# Patient Record
Sex: FEMALE | Race: WHITE
[De-identification: names, ages, dates, MRNs, and addresses within clinical notes are randomized per-mention and may not be internally consistent; named-entity substitution may affect disease eponyms.]

---

## 2020-04-18 ENCOUNTER — HOSPITAL ENCOUNTER (EMERGENCY)
Dept: HOSPITAL 63 - ER | Age: 65
Discharge: HOME | End: 2020-04-18
Payer: OTHER GOVERNMENT

## 2020-04-18 VITALS — WEIGHT: 188.05 LBS | HEIGHT: 71 IN | BODY MASS INDEX: 26.33 KG/M2

## 2020-04-18 VITALS — DIASTOLIC BLOOD PRESSURE: 53 MMHG | SYSTOLIC BLOOD PRESSURE: 104 MMHG

## 2020-04-18 DIAGNOSIS — R05: ICD-10-CM

## 2020-04-18 DIAGNOSIS — E03.9: ICD-10-CM

## 2020-04-18 DIAGNOSIS — K21.9: ICD-10-CM

## 2020-04-18 DIAGNOSIS — Z88.8: ICD-10-CM

## 2020-04-18 DIAGNOSIS — R50.9: ICD-10-CM

## 2020-04-18 DIAGNOSIS — I48.20: ICD-10-CM

## 2020-04-18 DIAGNOSIS — I10: ICD-10-CM

## 2020-04-18 DIAGNOSIS — Z20.818: ICD-10-CM

## 2020-04-18 DIAGNOSIS — B34.9: Primary | ICD-10-CM

## 2020-04-18 DIAGNOSIS — R07.89: ICD-10-CM

## 2020-04-18 DIAGNOSIS — J45.909: ICD-10-CM

## 2020-04-18 LAB
ALBUMIN SERPL-MCNC: 3.9 G/DL (ref 3.4–5)
ALBUMIN/GLOB SERPL: 1.1 {RATIO} (ref 1–1.7)
ALP SERPL-CCNC: 64 U/L (ref 46–116)
ALT SERPL-CCNC: 50 U/L (ref 14–59)
ANION GAP SERPL CALC-SCNC: 8 MMOL/L (ref 6–14)
AST SERPL-CCNC: 29 U/L (ref 15–37)
BASOPHILS # BLD AUTO: 0 X10^3/UL (ref 0–0.2)
BASOPHILS NFR BLD: 0 % (ref 0–3)
BILIRUB SERPL-MCNC: 0.6 MG/DL (ref 0.2–1)
BUN/CREAT SERPL: 10 (ref 6–20)
CA-I SERPL ISE-MCNC: 8 MG/DL (ref 7–20)
CALCIUM SERPL-MCNC: 9 MG/DL (ref 8.5–10.1)
CHLORIDE SERPL-SCNC: 97 MMOL/L (ref 98–107)
CO2 SERPL-SCNC: 29 MMOL/L (ref 21–32)
CREAT SERPL-MCNC: 0.8 MG/DL (ref 0.6–1)
CRP SERPL-MCNC: 16.3 MG/L (ref 0–3.3)
EOSINOPHIL NFR BLD: 0 % (ref 0–3)
EOSINOPHIL NFR BLD: 0 X10^3/UL (ref 0–0.7)
ERYTHROCYTE [DISTWIDTH] IN BLOOD BY AUTOMATED COUNT: 12.8 % (ref 11.5–14.5)
GFR SERPLBLD BASED ON 1.73 SQ M-ARVRAT: 72.4 ML/MIN
GLOBULIN SER-MCNC: 3.7 G/DL (ref 2.2–3.8)
GLUCOSE SERPL-MCNC: 111 MG/DL (ref 70–99)
HCT VFR BLD CALC: 46.6 % (ref 36–47)
HGB BLD-MCNC: 16.1 G/DL (ref 12–15.5)
LYMPHOCYTES # BLD: 0.5 X10^3/UL (ref 1–4.8)
LYMPHOCYTES NFR BLD AUTO: 19 % (ref 24–48)
MCH RBC QN AUTO: 33 PG (ref 25–35)
MCHC RBC AUTO-ENTMCNC: 35 G/DL (ref 31–37)
MCV RBC AUTO: 96 FL (ref 79–100)
MONO #: 0.2 X10^3/UL (ref 0–1.1)
MONOCYTES NFR BLD: 9 % (ref 0–9)
NEUT #: 2 X10^3UL (ref 1.8–7.7)
NEUTROPHILS NFR BLD AUTO: 73 % (ref 31–73)
PLATELET # BLD AUTO: 170 X10^3/UL (ref 140–400)
POTASSIUM SERPL-SCNC: 3.4 MMOL/L (ref 3.5–5.1)
PROT SERPL-MCNC: 7.6 G/DL (ref 6.4–8.2)
RBC # BLD AUTO: 4.86 X10^6/UL (ref 3.5–5.4)
SODIUM SERPL-SCNC: 134 MMOL/L (ref 136–145)
WBC # BLD AUTO: 2.7 X10^3/UL (ref 4–11)

## 2020-04-18 PROCEDURE — 84484 ASSAY OF TROPONIN QUANT: CPT

## 2020-04-18 PROCEDURE — 82728 ASSAY OF FERRITIN: CPT

## 2020-04-18 PROCEDURE — 83880 ASSAY OF NATRIURETIC PEPTIDE: CPT

## 2020-04-18 PROCEDURE — 99284 EMERGENCY DEPT VISIT MOD MDM: CPT

## 2020-04-18 PROCEDURE — 86140 C-REACTIVE PROTEIN: CPT

## 2020-04-18 PROCEDURE — 36415 COLL VENOUS BLD VENIPUNCTURE: CPT

## 2020-04-18 PROCEDURE — 87040 BLOOD CULTURE FOR BACTERIA: CPT

## 2020-04-18 PROCEDURE — 84145 PROCALCITONIN (PCT): CPT

## 2020-04-18 PROCEDURE — 80053 COMPREHEN METABOLIC PANEL: CPT

## 2020-04-18 PROCEDURE — 71045 X-RAY EXAM CHEST 1 VIEW: CPT

## 2020-04-18 PROCEDURE — 85025 COMPLETE CBC W/AUTO DIFF WBC: CPT

## 2020-04-18 NOTE — RAD
Single view chest dated 4/18/2020.

 

Comparison made to 4/15/2020.

 

CLINICAL INDICATION: Shortness of breath.

 

FINDINGS:

 

Single upright portable exam performed. Heart and mediastinal contours are

stable. Lungs are clear. No consolidation or pleural effusion. No 

pneumothorax.

 

IMPRESSION:

 

No acute radiographic abnormality. Stable findings compared to 4/15/2020.

 

Electronically signed by: Adalid Whittaker MD (4/18/2020 2:02 PM) 

[facility] [place]

## 2020-04-20 NOTE — PHYS DOC
Past History


Past Medical History:  A-Fib, Asthma, GERD, Hypertension, Hypothyroid


Past Surgical History:  No Surgical History


Alcohol Use:  None


Drug Use:  None





Adult General


Chief Complaint


Chief Complaint:  SHORTNESS OF BREATH





HPI


HPI





Patient is a female history of A. fib, COPD recent diagnosis of COVID-19 

presents with productive cough, sweats, chest tightness. Patient also reports 

body aches and fever. Temperature 101. Patient evaluated emergency department 3 

days ago for the same. Denies progression of symptoms. No nausea vomiting 

diarrhea. Denies decreased oral intake. []





Review of Systems


Review of Systems


ROS as per HPI


All other systems were reviewed and found to be within normal limits, except as 

documented in this note.





Current Medications


Current Medications





Current Medications








 Medications


  (Trade)  Dose


 Ordered  Sig/Tammi  Start Time


 Stop Time Status Last Admin


Dose Admin


 


 Acetaminophen


  (Tylenol)  650 mg  1X  ONCE  4/18/20 13:45


 4/18/20 13:46 DC 4/18/20 13:40


650 MG











Allergies


Allergies





Allergies








Coded Allergies Type Severity Reaction Last Updated Verified


 


  rivaroxaban Allergy Unknown  4/20/20 Yes











Physical Exam


Physical Exam





Constitutional: Well developed, well nourished, no acute distress, non-toxic 

appearance. []


HENT: Normocephalic, atraumatic, bilateral external ears normal, oropharynx 

moist, no oral exudates, nose normal. []


Eyes: PERRLA, EOMI, conjunctiva normal, no discharge. [] 


Neck: Normal range of motion, no tenderness, supple, no stridor. [] 


Cardiovascular:Heart rate regular rhythm, no murmur []


Lungs & Thorax:  Bilateral breath sounds clear to auscultation []


Abdomen: Bowel sounds normal, soft, no tenderness. [] 


Skin: Warm, dry, no erythema, no rash. [] 


Back: No tenderness,. [] 


Extremities: No tenderness, no edema. [] 


Neurologic: Alert and oriented X 3, normal motor function, normal sensory 

function, no focal deficits noted. []


Psychologic: Affect normal, judgement normal, mood normal. []





Current Patient Data


Vital Signs





                                   Vital Signs








  Date Time  Temp Pulse Resp B/P (MAP) Pulse Ox O2 Delivery O2 Flow Rate FiO2


 


4/18/20 14:24  73 20 104/53 (70) 94 Room Air  


 


4/18/20 13:20 101.0       








Lab Results





Microbiology


4/18/20 Blood Culture - Preliminary, Resulted


          NO GROWTH AFTER 1 DAY...





EKG


EKG


[]





Radiology/Procedures


Radiology/Procedures


[Chear x-ray: Reviewed]





Course & Med Decision Making


Course & Med Decision Making


Pertinent Labs and Imaging studies reviewed. (See chart for details)





[COVID without significant respiratory compromise. Lab work reviewed and reassu

ring. Symptom improvement and treatment. Recommend continued home 14, watchful 

waiting and supportive care and return precautions reviewed. Patient verbalizes 

understanding agreement discharge instructions prior to departure.]





Dragon Disclaimer


Dragon Disclaimer


This electronic medical record was generated, in whole or in part, using a voice

recognition dictation system.





Departure


Departure:


Impression:  


   Primary Impression:  


   Viral syndrome


Disposition:  01 HOME/RESIDENCE PRIOR TO ADM


Condition:  STABLE


Patient Instructions:  Viral Syndrome





Additional Instructions:  


You were evaluated in the ED for fever, bodyaches and cough. Labs and imaging 

were reviewed and are conistent with a viral syndrome and likely COVID-19. 

Please continue to stay hydrated and take Tylenol for fever.  Continue self-

quarentine and return to the ED if symptoms worsen.











ROSALEE INMAN DO                   Apr 20, 2020 11:58

## 2021-11-27 ENCOUNTER — HOSPITAL ENCOUNTER (EMERGENCY)
Dept: HOSPITAL 63 - ER | Age: 66
Discharge: HOME | End: 2021-11-27
Payer: OTHER GOVERNMENT

## 2021-11-27 VITALS — WEIGHT: 188.05 LBS | BODY MASS INDEX: 26.33 KG/M2 | HEIGHT: 71 IN

## 2021-11-27 VITALS
SYSTOLIC BLOOD PRESSURE: 136 MMHG | DIASTOLIC BLOOD PRESSURE: 81 MMHG | DIASTOLIC BLOOD PRESSURE: 81 MMHG | SYSTOLIC BLOOD PRESSURE: 136 MMHG

## 2021-11-27 DIAGNOSIS — J45.909: ICD-10-CM

## 2021-11-27 DIAGNOSIS — R42: Primary | ICD-10-CM

## 2021-11-27 DIAGNOSIS — I10: ICD-10-CM

## 2021-11-27 DIAGNOSIS — Z20.822: ICD-10-CM

## 2021-11-27 LAB
ALBUMIN SERPL-MCNC: 4 G/DL (ref 3.4–5)
ALBUMIN/GLOB SERPL: 1.6 {RATIO} (ref 1–1.7)
ALP SERPL-CCNC: 80 U/L (ref 46–116)
ALT SERPL-CCNC: 35 U/L (ref 14–59)
ANION GAP SERPL CALC-SCNC: 9 MMOL/L (ref 6–14)
APTT PPP: YELLOW S
AST SERPL-CCNC: 18 U/L (ref 15–37)
BACTERIA #/AREA URNS HPF: 0 /HPF
BASOPHILS # BLD AUTO: 0 X10^3/UL (ref 0–0.2)
BASOPHILS NFR BLD: 0 % (ref 0–3)
BILIRUB SERPL-MCNC: 0.6 MG/DL (ref 0.2–1)
BILIRUB UR QL STRIP: (no result)
BUN/CREAT SERPL: 21 (ref 6–20)
CA-I SERPL ISE-MCNC: 15 MG/DL (ref 7–20)
CALCIUM SERPL-MCNC: 8.9 MG/DL (ref 8.5–10.1)
CHLORIDE SERPL-SCNC: 105 MMOL/L (ref 98–107)
CO2 SERPL-SCNC: 30 MMOL/L (ref 21–32)
CREAT SERPL-MCNC: 0.7 MG/DL (ref 0.6–1)
EOSINOPHIL NFR BLD: 0 X10^3/UL (ref 0–0.7)
EOSINOPHIL NFR BLD: 1 % (ref 0–3)
ERYTHROCYTE [DISTWIDTH] IN BLOOD BY AUTOMATED COUNT: 12.8 % (ref 11.5–14.5)
FIBRINOGEN PPP-MCNC: CLEAR MG/DL
GFR SERPLBLD BASED ON 1.73 SQ M-ARVRAT: 84.2 ML/MIN
GLOBULIN SER-MCNC: 2.5 G/DL (ref 2.2–3.8)
GLUCOSE SERPL-MCNC: 100 MG/DL (ref 70–99)
GLUCOSE UR STRIP-MCNC: (no result) MG/DL
HCT VFR BLD CALC: 45.4 % (ref 36–47)
HGB BLD-MCNC: 15.1 G/DL (ref 12–15.5)
LYMPHOCYTES # BLD: 1 X10^3/UL (ref 1–4.8)
LYMPHOCYTES NFR BLD AUTO: 17 % (ref 24–48)
MAGNESIUM SERPL-MCNC: 2.4 MG/DL (ref 1.8–2.4)
MCH RBC QN AUTO: 33 PG (ref 25–35)
MCHC RBC AUTO-ENTMCNC: 33 G/DL (ref 31–37)
MCV RBC AUTO: 100 FL (ref 79–100)
MONO #: 0.4 X10^3/UL (ref 0–1.1)
MONOCYTES NFR BLD: 6 % (ref 0–9)
NEUT #: 4.5 X10^3UL (ref 1.8–7.7)
NEUTROPHILS NFR BLD AUTO: 76 % (ref 31–73)
NITRITE UR QL STRIP: (no result)
PHOSPHATE SERPL-MCNC: 3.4 MG/DL (ref 2.6–4.7)
PLATELET # BLD AUTO: 217 X10^3/UL (ref 140–400)
POTASSIUM SERPL-SCNC: 4.5 MMOL/L (ref 3.5–5.1)
PROT SERPL-MCNC: 6.5 G/DL (ref 6.4–8.2)
RBC # BLD AUTO: 4.56 X10^6/UL (ref 3.5–5.4)
RBC #/AREA URNS HPF: 0 /HPF (ref 0–2)
SODIUM SERPL-SCNC: 144 MMOL/L (ref 136–145)
SP GR UR STRIP: 1.01
UROBILINOGEN UR-MCNC: 0.2 MG/DL
WBC # BLD AUTO: 6 X10^3/UL (ref 4–11)
WBC #/AREA URNS HPF: 0 /HPF (ref 0–4)

## 2021-11-27 PROCEDURE — U0003 INFECTIOUS AGENT DETECTION BY NUCLEIC ACID (DNA OR RNA); SEVERE ACUTE RESPIRATORY SYNDROME CORONAVIRUS 2 (SARS-COV-2) (CORONAVIRUS DISEASE [COVID-19]), AMPLIFIED PROBE TECHNIQUE, MAKING USE OF HIGH THROUGHPUT TECHNOLOGIES AS DESCRIBED BY CMS-2020-01-R: HCPCS

## 2021-11-27 PROCEDURE — C9803 HOPD COVID-19 SPEC COLLECT: HCPCS

## 2021-11-27 PROCEDURE — 93005 ELECTROCARDIOGRAM TRACING: CPT

## 2021-11-27 PROCEDURE — 81001 URINALYSIS AUTO W/SCOPE: CPT

## 2021-11-27 PROCEDURE — 83735 ASSAY OF MAGNESIUM: CPT

## 2021-11-27 PROCEDURE — 83605 ASSAY OF LACTIC ACID: CPT

## 2021-11-27 PROCEDURE — 36415 COLL VENOUS BLD VENIPUNCTURE: CPT

## 2021-11-27 PROCEDURE — 70498 CT ANGIOGRAPHY NECK: CPT

## 2021-11-27 PROCEDURE — 84100 ASSAY OF PHOSPHORUS: CPT

## 2021-11-27 PROCEDURE — 70450 CT HEAD/BRAIN W/O DYE: CPT

## 2021-11-27 PROCEDURE — 85025 COMPLETE CBC W/AUTO DIFF WBC: CPT

## 2021-11-27 PROCEDURE — 83880 ASSAY OF NATRIURETIC PEPTIDE: CPT

## 2021-11-27 PROCEDURE — 84484 ASSAY OF TROPONIN QUANT: CPT

## 2021-11-27 PROCEDURE — 80053 COMPREHEN METABOLIC PANEL: CPT

## 2021-11-27 PROCEDURE — 70496 CT ANGIOGRAPHY HEAD: CPT

## 2021-11-27 RX ADMIN — IOHEXOL ONE ML: 350 INJECTION, SOLUTION INTRAVENOUS at 11:45

## 2021-11-27 RX ADMIN — MECLIZINE ONE MG: 12.5 TABLET ORAL at 13:00

## 2021-11-27 NOTE — RAD
PQRS Compliance Statement:



One or more of the following individualized dose reduction techniques were utilized for this examinat
ion:  

1. Automated exposure control  

2. Adjustment of the mA and/or kV according to patient size  

3. Use of iterative reconstruction technique



CTA HEAD AND NECK W/WO CONTRAST 11/27/2021 12:47 PM



INDICATION: Vertigo



COMPARISON: CT head 11/27/2021



TECHNIQUE: Multiple axial CT images of the head and neck were obtained after the intravenous administ
ration of nonionic contrast. Coronal and sagittal reformats are provided. Maximum intensity projectio
n images are provided.



Stenosis calculations for CT, MR, and conventional angiography are based upon measurements of the dis
lori ICA diameter in accordance with the NASCET methodology. Stenosis calculations for carotid ultraso
und studies are derived from validated velocity criteria which are known to correlate with the NASCET
 methodology.



FINDINGS:



Ventricles, sulci and basal cisterns are normal in appearance. Gray-white matter differentiation is p
reserved. There is no mass, mass effect or midline shift. Posterior fossa is normal in appearance. Se
lla and suprasellar cistern appear normal. Orbits are normal in appearance with exception of bilatera
l lens replacement. Scalp and calvaria appear intact. Paranasal sinuses are well aerated. Mastoid air
 cells are well aerated.



Visualized portions of lungs are clear. Thyroid gland is normal in appearance. Neck soft tissues are 
normal in appearance. No pathologically enlarged cervical lymphadenopathy. Pharynx and larynx appear 
intact.



Vascular findings:



There is ectasia of the ascending thoracic aorta measuring 4.3 cm. Origins of the brachiocephalic ves
sels are widely patent.



Right common carotid artery is normal in course and caliber. No significant atherosclerotic changes a
re identified at the right carotid bifurcation. Undulation of the contour of the cervical right inter
nal carotid artery favors hypervascular dysplasia. No significant stenosis of the right cervical inte
rnal carotid artery. External carotid artery is widely patent.



Left common carotid artery is normal in course and caliber. Minor calcified atheromatous plaque ident
ified at the left carotid bifurcation. No significant stenosis of the left cervical internal carotid 
artery. External carotid artery is widely patent.



Vertebral arteries are normal in course and caliber.



Intracranial segments of internal carotid arteries are normal in course and caliber. There is mild ca
lcified atheromatous plaque involving the cavernous segments without significant stenosis. Origins of
 the ophthalmic segments of the internal carotid artery appears widely patent. Middle cerebral arteri
es are normal in course and caliber with patent sylvian branches.  Anterior cerebral arteries are nor
mal in course and caliber. Anterior communicating artery is visualized.



Basilar artery is normal in course and caliber. Superior cerebellar arteries are widely patent. Poste
rior cerebral arteries are normal in course and caliber.



There is no aneurysm, vascular malformation or high-grade stenosis/large vessel occlusion involving c
ircle of [place]. Superior sagittal sinus is patent. Mild cervical spondylosis at C5-C6 and C6-C7.



IMPRESSION:

1. There is no evidence for acute intracranial hemorrhage.

2. There is no evidence for hemodynamically significant carotid stenosis. There is suggestion of fibr
omuscular dysplasia involving the distal right cervical internal carotid artery, less apparent involv
ing the left cervical internal carotid artery.

3. There is no aneurysm, vascular malformation or high-grade stenosis/large vessel occlusion involvin
g the [place] of [place]. 

4. Ectasia of the ascending thoracic aorta measures 4.3 cm.



Electronically signed by: Nohemy Gates MD (11/27/2021 1:30 PM) [id number]

## 2021-11-27 NOTE — RAD
EXAM: Head CT without contrast.



HISTORY: Vertigo.



TECHNIQUE: Computed tomographic images of the head were obtained without contrast.



*One or more of the following individualized dose reduction techniques were utilized for this examina
tion:  

1. Automated exposure control.  

2. Adjustment of the mA and/or kV according to patient size.  

3. Use of iterative reconstruction technique.



COMPARISON: None.



FINDINGS: There is no acute or subacute extra-axial or intraparenchymal hemorrhage. There is no mass 
effect or midline shift. There is no hydrocephalus. 



The gray-white matter differentiation pattern is intact.



The visualized portions of the orbits, paranasal sinuses and mastoid air cells are unremarkable. No s
uspicious calvarial lesion is seen.



IMPRESSION: 



No acute intracranial findings.



Electronically signed by: Melissa Michael MD (11/27/2021 1:03 PM) [id number]

## 2021-11-27 NOTE — PHYS DOC
Past History


Past Medical History:  A-Fib, Asthma, GERD, Hypertension, Hypothyroid


Additional Past Medical Histor:  tachycardia


Past Surgical History:  Other


Additional Past Surgical Histo:  right foot plantar


Alcohol Use:  Occasionally


Drug Use:  None





General Adult


EDM:


Chief Complaint:  DIZZY/LIGHT HEADED





HPI:


HPI:





Patient is a 65-year-old female coming in for episodes of "dizziness" that 

started yesterday.  Patient states about an hour and half she woke up she had 

gone to her  when she started to feel spinning and like she might pass o

ut.  Denies any vision changes, vomiting, or recent head injury.  She states she

laid down for a few hours and symptoms eventually resolved.  Started noticing 

them again today.  Patient states she woke up during the night to take 

over-the-counter medications for headache.  She says that she has pressure in 

her ears but denies any hearing loss or tinnitus.  Denies any congestion.  S

tates that she was exposed to a Covid positive family member 7 days ago, but has

had both of her Covid vaccines.  States she has a history of paroxysmal 

ventricular tachycardia and has been diagnosed with A. fib that happened 1 time.

 Denies any palpitations.  Did not take any blood thinners.  Takes Bystolic for 

her intermittent ventricular tachycardia.





Review of Systems:


Review of Systems:


All other systems within normal limits except for as noted in the HPI





Allergies:


Allergies:





Allergies








Coded Allergies Type Severity Reaction Last Updated Verified


 


  rivaroxaban Allergy Unknown  21 Yes











Physical Exam:


PE:


Constitutional: Well developed, well nourished, no acute distress, non-toxic 

appearance. []


HENT: Normocephalic, atraumatic, bilateral external ears normal,  nose normal.  

Bilateral ear canals and TMs normal []


Eyes: PERRLA, conjunctiva normal, no discharge.  Extremities intact, slight 

nystagmus to left [] 


Neck: No rigidity, supple, no stridor. [] 


Cardiovascular: Regular rate and rhythm, brisk cap refill []


Lungs & Thorax: Non labored symmetric respirations, no tachypnea or respiratory 

distress []


Abdomen: Soft, nondistended.


Skin: Warm, dry, no erythema, no rash. [] 


Back: Unremarkable


Extremities: No deformities, range of motion grossly intact, no lower extremity 

edema [] 


Neurologic: Alert and oriented X 3, no focal deficits noted.  CN II through XII 

intact and symmetric []


Psychologic: Affect normal, judgement normal, mood normal. []





Current Patient Data:


Vital Signs:





                                   Vital Signs








  Date Time  Temp Pulse Resp B/P (MAP) Pulse Ox O2 Delivery O2 Flow Rate FiO2


 


21 11:00 98.1 61 16 145/64 (91) 100 Room Air  











EKG:


EKG:


Sinus rhythm, heart rate 58 bpm, left axis deviation, no ST elevation or 

depression, no ectopy, (occasional T waves noted on monitor)





Radiology/Procedures:


Radiology/Procedures:


[facility] [address], [place] [address]


                                 [phone]


                                        


                                 IMAGING REPORT





                                     Signed





PATIENT: ROSA FARRIS ACCOUNT: [id number]     MRN#: [id number]


: 1955           LOCATION: ER              AGE: 65


SEX: F                    EXAM DT: 21         ACCESSION#: [id number]


STATUS: REG ER            ORD. PHYSICIAN: ZULY SOSA MD


REASON: vertigo


PROCEDURE: CT ANGIOGRAPHY HEAD AND NECK





PQRS Compliance Statement:





One or more of the following individualized dose reduction techniques were 

utilized for this examination:  


1. Automated exposure control  


2. Adjustment of the mA and/or kV according to patient size  


3. Use of iterative reconstruction technique





CTA HEAD AND NECK W/WO CONTRAST 2021 12:47 PM





INDICATION: Vertigo





COMPARISON: CT head 2021





TECHNIQUE: Multiple axial CT images of the head and neck were obtained after the

 intravenous administration of nonionic contrast. Coronal and sagittal reformats

 are provided. Maximum intensity projection images are provided.





Stenosis calculations for CT, MR, and conventional angiography are based upon 

measurements of the distal ICA diameter in accordance with the NASCET 

methodology. Stenosis calculations for carotid ultrasound studies are derived 

from validated velocity criteria which are known to correlate with the NASCET 

methodology.





FINDINGS:





Ventricles, sulci and basal cisterns are normal in appearance. Gray-white matter

 differentiation is preserved. There is no mass, mass effect or midline shift. 

Posterior fossa is normal in appearance. Sella and suprasellar cistern appear 

normal. Orbits are normal in appearance with exception of bilateral lens 

replacement. Scalp and calvaria appear intact. Paranasal sinuses are well 

aerated. Mastoid air cells are well aerated.





Visualized portions of lungs are clear. Thyroid gland is normal in appearance. 

Neck soft tissues are normal in appearance. No pathologically enlarged cervical 

lymphadenopathy. Pharynx and larynx appear intact.





Vascular findings:





There is ectasia of the ascending thoracic aorta measuring 4.3 cm. Origins of 

the brachiocephalic vessels are widely patent.





Right common carotid artery is normal in course and caliber. No significant 

atherosclerotic changes are identified at the right carotid bifurcation. 

Undulation of the contour of the cervical right internal carotid artery favors 

hypervascular dysplasia. No significant stenosis of the right cervical internal 

carotid artery. External carotid artery is widely patent.





Left common carotid artery is normal in course and caliber. Minor calcified 

atheromatous plaque identified at the left carotid bifurcation. No significant 

stenosis of the left cervical internal carotid artery. External carotid artery 

is widely patent.





Vertebral arteries are normal in course and caliber.





Intracranial segments of internal carotid arteries are normal in course and 

caliber. There is mild calcified atheromatous plaque involving the cavernous 

segments without significant stenosis. Origins of the ophthalmic segments of the

 internal carotid artery appears widely patent. Middle cerebral arteries are 

normal in course and caliber with patent sylvian branches.  Anterior cerebral 

arteries are normal in course and caliber. Anterior communicating artery is 

visualized.





Basilar artery is normal in course and caliber. Superior cerebellar arteries are

 widely patent. Posterior cerebral arteries are normal in course and caliber.





There is no aneurysm, vascular malformation or high-grade stenosis/large vessel 

occlusion involving [place] of [place]. Superior sagittal sinus is patent. Mild 

cervical spondylosis at C5-C6 and C6-C7.





IMPRESSION:


1. There is no evidence for acute intracranial hemorrhage.


2. There is no evidence for hemodynamically significant carotid stenosis. There 

is suggestion of fibromuscular dysplasia involving the distal right cervical 

internal carotid artery, less apparent involving the left cervical internal 

carotid artery.


3. There is no aneurysm, vascular malformation or high-grade stenosis/large 

vessel occlusion involving the [place] of [place]. 


4. Ectasia of the ascending thoracic aorta measures 4.3 cm.





Electronically signed by: Tomasz Marti MD (2021 1:30 PM) [id number]














DICTATED AND SIGNED BY:     TOMASZ MARTI MD


DATE:     21 1322





CC: ZULY SOSA MD; SERGIO PICKERING DO ~MTH0 0


[][facility] [address], [place] [address]


                                 [phone]


                                        


                                 IMAGING REPORT





                                     Signed





PATIENT: ROSA FARRIS ACCOUNT: [id number]     MRN#: [id number]


: 1955           LOCATION: ER              AGE: 65


SEX: F                    EXAM DT: 21         ACCESSION#: [id number]


STATUS: REG ER            ORD. PHYSICIAN: ZULY SOSA MD


REASON: vertigo


PROCEDURE: CT HEAD WO CONTRAST





EXAM: Head CT without contrast.





HISTORY: Vertigo.





TECHNIQUE: Computed tomographic images of the head were obtained without 

contrast.





*One or more of the following individualized dose reduction techniques were 

utilized for this examination:  


1. Automated exposure control.  


2. Adjustment of the mA and/or kV according to patient size.  


3. Use of iterative reconstruction technique.





COMPARISON: None.





FINDINGS: There is no acute or subacute extra-axial or intraparenchymal 

hemorrhage. There is no mass effect or midline shift. There is no hydrocephalus.

 





The gray-white matter differentiation pattern is intact.





The visualized portions of the orbits, paranasal sinuses and mastoid air cells 

are unremarkable. No suspicious calvarial lesion is seen.





IMPRESSION: 





No acute intracranial findings.





Electronically signed by: Melissa Maciel MD (2021 1:03 PM) [id number]














DICTATED AND SIGNED BY:     MELISSA MACIEL MD


DATE:     21 1302





CC: ZULY SOSA MD; SERGIO PICKERING DO ~MTH0 0





Heart Score:


C/O Chest Pain:  No


HEART Score for Chest Pain:  








HEART Score for Chest Pain Response (Comments) Value


 


History Slighlty/Non-Suspicious 0


 


ECG Normal 0


 


Age < 45 0


 


Risk Factors                            1 or 2 Risk Factors 1


 


Troponin < Normal Limit 0


 


Total  1








Risk Factors:


Risk Factors:  DM, Current or recent (<one month) smoker, HTN, HLP, family 

history of CAD, obesity.


Risk Scores:


Score 0 - 3:  2.5% MACE over next 6 weeks - Discharge Home


Score 4 - 6:  20.3% MACE over next 6 weeks - Admit for Clinical Observation


Score 7 - 10:  72.7% MACE over next 6 weeks - Early Invasive Strategies





Course & Med Decision Making:


Course & Med Decision Making


Pertinent Labs and Imaging studies reviewed. (See chart for details)





[]





Dragon Disclaimer:


Dragon Disclaimer:


This electronic medical record was generated, in whole or in part, using a voice

 recognition dictation system.





Departure


Departure:


Impression:  


   Primary Impression:  


   Vertigo


Disposition:   HOME / SELF CARE / HOMELESS


Condition:  STABLE


Referrals:  


SERGIO PICKERING DO (PCP)


Patient Instructions:  Vertigo


Scripts


Meclizine Hcl (MECLIZINE HCL) 25 Mg Tablet


1 TAB PO PRN TID for vertigo for 10 Days, #30 TAB


   Prov: ZULY OSSA MD         21











ZULY SOSA MD            2021 11:29

## 2021-11-28 NOTE — EKG
[facility] [address], [place] [address]

Test Date:    2021               Test Time:    11:23:17

Pat Name:     ROSA FARRIS           Department:   

Patient ID:   [id number]           Room:          

Gender:       F                        Technician:   BELLA

:          1955               Requested By: ZULY SOSA

Order Number: [id number]            Reading MD:   Ricco Mckeon

                                 Measurements

Intervals                              Axis          

Rate:         58                       P:            22

CA:           230                      QRS:          -38

QRSD:         108                      T:            -34

QT:           422                                    

QTc:          418                                    

                           Interpretive Statements

SINUS BRADYCARDIA

PROLONGED CA INTERVAL

ABNORMAL LEFT AXIS DEVIATION

LEFT ANTERIOR FASCICULAR BLOCK

Electronically Signed On 2021 9:53:50 CST by Rcico Mckeon

## 2022-02-10 ENCOUNTER — HOSPITAL ENCOUNTER (OUTPATIENT)
Dept: HOSPITAL 63 - DXRAD | Age: 67
End: 2022-02-10
Attending: SPECIALIST
Payer: MEDICARE

## 2022-02-10 DIAGNOSIS — M85.88: ICD-10-CM

## 2022-02-10 DIAGNOSIS — Z13.820: Primary | ICD-10-CM

## 2022-02-10 PROCEDURE — 77080 DXA BONE DENSITY AXIAL: CPT

## 2022-02-10 NOTE — RAD
EXAM: DUAL ENERGY X-RAY ABSORPTIOMETRY (DEXA).



HISTORY: Postmenopausal screening.



FINDINGS: The lowest measured T-score is -1.2 in the right hip, based on a bone mineral density of 0.
811 g/cm^2. Refer to the worksheets for full detail.



No comparison examinations are available.



IMPRESSION:

1. Low bone mass. Bone mineral density yields a T-score between -1.0 and -2.5. Fracture risk is incre
ased.

2. FRAX report: Not calculated.





METHODOLOGY: Dual energy x-ray absorptiometry was performed to measure bone mineral density. The foll
owing analysis is based on the 2019 Official Positions of the International Society for Clinical Dens
itometry: 



Measurements of the hips and the average of L1-L4 are preferred. When the spine and/or hip cannot be 
feasibly measured or interpreted, or in the setting of hyperparathyroidism, distal radial bone minera
l density may be measured. 



The lumbar spine T-score is based on the average bone mineral density of L1-L4. In the setting of art
ifact or anatomic abnormality, some lumbar levels may be excluded, and the remaining levels used for 
calculation. A single lumbar level is not used for diagnosis, and if only a single level is available
 for assessment, another anatomic site will be used to assign a diagnosis.



The hip T-score is based on the bone mineral density measurement of the femoral neck or total proxima
l femur of either side, whichever is lowest. Bilateral mean values are not used for diagnosis.



The forearm T-score is derived from 33% of the distal radius of the nondominant forearm.



Electronically signed by: Melissa Michael MD (2/10/2022 4:13 PM) [id number]